# Patient Record
Sex: FEMALE | Race: WHITE | NOT HISPANIC OR LATINO | Employment: STUDENT | URBAN - METROPOLITAN AREA
[De-identification: names, ages, dates, MRNs, and addresses within clinical notes are randomized per-mention and may not be internally consistent; named-entity substitution may affect disease eponyms.]

---
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
## 2018-11-07 ENCOUNTER — OFFICE VISIT (OUTPATIENT)
Dept: FAMILY MEDICINE | Facility: CLINIC | Age: 18
End: 2018-11-07
Payer: COMMERCIAL

## 2018-11-07 VITALS
WEIGHT: 164.31 LBS | BODY MASS INDEX: 26.41 KG/M2 | HEART RATE: 117 BPM | TEMPERATURE: 99 F | DIASTOLIC BLOOD PRESSURE: 72 MMHG | SYSTOLIC BLOOD PRESSURE: 116 MMHG | HEIGHT: 66 IN

## 2018-11-07 DIAGNOSIS — J02.9 SORE THROAT: Primary | ICD-10-CM

## 2018-11-07 LAB
CTP QC/QA: YES
S PYO RRNA THROAT QL PROBE: NEGATIVE

## 2018-11-07 PROCEDURE — 87880 STREP A ASSAY W/OPTIC: CPT | Mod: QW,S$GLB,, | Performed by: NURSE PRACTITIONER

## 2018-11-07 PROCEDURE — 99999 PR PBB SHADOW E&M-EST. PATIENT-LVL III: CPT | Mod: PBBFAC,,, | Performed by: NURSE PRACTITIONER

## 2018-11-07 PROCEDURE — 99213 OFFICE O/P EST LOW 20 MIN: CPT | Mod: S$GLB,,, | Performed by: NURSE PRACTITIONER

## 2018-11-07 PROCEDURE — 3008F BODY MASS INDEX DOCD: CPT | Mod: CPTII,S$GLB,, | Performed by: NURSE PRACTITIONER

## 2018-11-07 RX ORDER — METHYLPREDNISOLONE 4 MG/1
TABLET ORAL
Qty: 1 PACKAGE | Refills: 0 | Status: SHIPPED | OUTPATIENT
Start: 2018-11-07 | End: 2019-02-21

## 2018-11-07 NOTE — PROGRESS NOTES
Subjective:       Patient ID: Jeanna Kraus is a 18 y.o. female.    Chief Complaint: Sore Throat    Sore Throat    This is a new problem. The current episode started in the past 7 days. The problem has been unchanged. There has been no fever. The pain is moderate. Associated symptoms include congestion and trouble swallowing. Pertinent negatives include no abdominal pain, coughing, diarrhea, ear pain, headaches, shortness of breath or vomiting. She has tried acetaminophen (Sudafed) for the symptoms. The treatment provided no relief.       Review of Systems   Constitutional: Negative for fatigue, fever and unexpected weight change.   HENT: Positive for congestion, sore throat and trouble swallowing. Negative for ear pain.    Eyes: Negative for pain and visual disturbance.   Respiratory: Negative for cough and shortness of breath.    Cardiovascular: Negative for chest pain and palpitations.   Gastrointestinal: Negative for abdominal pain, diarrhea and vomiting.   Musculoskeletal: Negative for arthralgias and myalgias.   Skin: Negative for color change and rash.   Neurological: Negative for dizziness and headaches.   Psychiatric/Behavioral: Negative for dysphoric mood and sleep disturbance. The patient is not nervous/anxious.        Vitals:    11/07/18 1551   BP: 116/72   Pulse: (!) 117   Temp: 98.5 °F (36.9 °C)       Objective:     Current Outpatient Medications   Medication Sig Dispense Refill    methylPREDNISolone (MEDROL DOSEPACK) 4 mg tablet use as directed 1 Package 0     No current facility-administered medications for this visit.        Physical Exam   Constitutional: She is oriented to person, place, and time. She appears well-developed and well-nourished. No distress.   HENT:   Head: Normocephalic and atraumatic.   Right Ear: Tympanic membrane normal.   Left Ear: Tympanic membrane normal.   Nose: Mucosal edema and rhinorrhea present.   Mouth/Throat: Oropharyngeal exudate and posterior oropharyngeal edema  present.   Eyes: EOM are normal. Pupils are equal, round, and reactive to light.   Neck: Normal range of motion. Neck supple.   Cardiovascular: Normal rate and regular rhythm.   Pulmonary/Chest: Effort normal and breath sounds normal.   Dry cough   Musculoskeletal: Normal range of motion.   Neurological: She is alert and oriented to person, place, and time.   Skin: Skin is warm and dry. No rash noted.   Psychiatric: She has a normal mood and affect. Judgment normal.   Nursing note and vitals reviewed.      Strep is negative    Assessment:       1. Sore throat        Plan:   Sore throat  -     POCT Rapid Strep A    Other orders  -     methylPREDNISolone (MEDROL DOSEPACK) 4 mg tablet; use as directed  Dispense: 1 Package; Refill: 0        No Follow-up on file.    There are no Patient Instructions on file for this visit.

## 2019-02-21 ENCOUNTER — OFFICE VISIT (OUTPATIENT)
Dept: FAMILY MEDICINE | Facility: CLINIC | Age: 19
End: 2019-02-21
Payer: COMMERCIAL

## 2019-02-21 ENCOUNTER — TELEPHONE (OUTPATIENT)
Dept: FAMILY MEDICINE | Facility: CLINIC | Age: 19
End: 2019-02-21

## 2019-02-21 VITALS
HEIGHT: 66 IN | DIASTOLIC BLOOD PRESSURE: 79 MMHG | BODY MASS INDEX: 26.36 KG/M2 | TEMPERATURE: 99 F | HEART RATE: 118 BPM | WEIGHT: 164 LBS | SYSTOLIC BLOOD PRESSURE: 123 MMHG

## 2019-02-21 DIAGNOSIS — J06.9 UPPER RESPIRATORY TRACT INFECTION, UNSPECIFIED TYPE: Primary | ICD-10-CM

## 2019-02-21 DIAGNOSIS — R68.89 FLU-LIKE SYMPTOMS: ICD-10-CM

## 2019-02-21 LAB
INFLUENZA A, MOLECULAR: NEGATIVE
INFLUENZA B, MOLECULAR: NEGATIVE
SPECIMEN SOURCE: NORMAL

## 2019-02-21 PROCEDURE — 99999 PR PBB SHADOW E&M-EST. PATIENT-LVL III: ICD-10-PCS | Mod: PBBFAC,,, | Performed by: FAMILY MEDICINE

## 2019-02-21 PROCEDURE — 99213 OFFICE O/P EST LOW 20 MIN: CPT | Mod: S$GLB,,, | Performed by: FAMILY MEDICINE

## 2019-02-21 PROCEDURE — 3008F PR BODY MASS INDEX (BMI) DOCUMENTED: ICD-10-PCS | Mod: CPTII,S$GLB,, | Performed by: FAMILY MEDICINE

## 2019-02-21 PROCEDURE — 99213 PR OFFICE/OUTPT VISIT, EST, LEVL III, 20-29 MIN: ICD-10-PCS | Mod: S$GLB,,, | Performed by: FAMILY MEDICINE

## 2019-02-21 PROCEDURE — 3008F BODY MASS INDEX DOCD: CPT | Mod: CPTII,S$GLB,, | Performed by: FAMILY MEDICINE

## 2019-02-21 PROCEDURE — 87502 INFLUENZA DNA AMP PROBE: CPT | Mod: PO

## 2019-02-21 PROCEDURE — 99999 PR PBB SHADOW E&M-EST. PATIENT-LVL III: CPT | Mod: PBBFAC,,, | Performed by: FAMILY MEDICINE

## 2019-02-21 RX ORDER — METHYLPREDNISOLONE 4 MG/1
TABLET ORAL
Qty: 1 PACKAGE | Refills: 0 | Status: SHIPPED | OUTPATIENT
Start: 2019-02-21 | End: 2021-10-07

## 2019-02-21 RX ORDER — OSELTAMIVIR PHOSPHATE 75 MG/1
75 CAPSULE ORAL 2 TIMES DAILY
Qty: 10 CAPSULE | Refills: 0 | Status: CANCELLED | OUTPATIENT
Start: 2019-02-21 | End: 2019-02-26

## 2019-02-21 RX ORDER — NORETHINDRONE ACETATE AND ETHINYL ESTRADIOL 1; 5 MG/1; UG/1
TABLET ORAL DAILY
COMMUNITY
End: 2023-03-29

## 2019-02-21 NOTE — PROGRESS NOTES
Jeanna Kraus presents with moderate upper respiratory congestion,rhinnorhea,moderate cough past 2-3 days. OTC help slightly. Denies nausea,vomiting,diarrhea or significant fever.    History reviewed. No pertinent past medical history.  History reviewed. No pertinent surgical history.  Review of patient's allergies indicates:  No Known Allergies  Current Outpatient Medications on File Prior to Visit   Medication Sig Dispense Refill    norethindrone-ethinyl estradiol (FEMHRT 1/5) 1-5 mg-mcg Tab Take by mouth once daily.      [DISCONTINUED] methylPREDNISolone (MEDROL DOSEPACK) 4 mg tablet use as directed 1 Package 0     No current facility-administered medications on file prior to visit.      Social History     Socioeconomic History    Marital status: Single     Spouse name: Not on file    Number of children: Not on file    Years of education: Not on file    Highest education level: Not on file   Social Needs    Financial resource strain: Not on file    Food insecurity - worry: Not on file    Food insecurity - inability: Not on file    Transportation needs - medical: Not on file    Transportation needs - non-medical: Not on file   Occupational History    Not on file   Tobacco Use    Smoking status: Never Smoker    Smokeless tobacco: Never Used   Substance and Sexual Activity    Alcohol use: No    Drug use: No    Sexual activity: No   Other Topics Concern    Not on file   Social History Narrative    Not on file     History reviewed. No pertinent family history.      ROS:  SKIN: No rashes, itching or changes in color or texture of skin.  EYES: Visual acuity fine. No photophobia, ocular pain or diplopia.EARS: Denies ear pain, discharge or vertigo.NOSE: No loss of smell, no epistaxis some postnasal drip.MOUTH & THROAT: No hoarseness or change in voice. No excessive gum bleeding.CHEST: Denies FELICIANO, cyanosis, wheezing  CARDIOVASCULAR: Denies chest pain, PND, orthopnea or reduced exercise  tolerance.  ABDOMEN:  No weight loss.No abdominal pain, no hematemesis or blood in stool.  URINARY: No flank pain, dysuria or hematuria.  PERIPHERAL VASCULAR: No claudication or cyanosis.  MUSCULOSKELETAL: Negative   NEUROLOGIC: No history of seizures, paralysis, alteration of gait or coordination.    PE: Vital signs as noted  Heent:Normocephalic with no recent cranial trauma,PERRLA,EOMI,conjunctiva clear,fundi reveal no hemmorhage exudate or papilledema.Otic canals clear, tympanic membranes slightly dull bilaterally.Nasal mucosa slightly red and edematous.Posterior pharynx slightly red but without exudate.  Neck:Supple with minimal anterior cervical adenopathy.  Chest:Clear bilateral breath sounds with mild scattered ronchi  Heart:Regular rhthym without murmer  Abdomen:Soft, non tender,no masses, no hepatosplenomegalyExtremeties and Neurologic:Grossly within normal limits  Impression: Upper Respiratory Infection. 465.9  Plan:   Flu test neg  ,medrol dspk

## 2019-02-21 NOTE — TELEPHONE ENCOUNTER
----- Message from Jeaneth Julien sent at 2/21/2019  7:48 AM CST -----  Type:  Same Day Appointment Request    Caller is requesting a same day appointment.  Caller declined first available appointment listed below.    Name of Caller: Pt Dad(Ryan)  When is the first available appointment? 2/22/19  Symptoms: Flu like symptoms  Best Call Back Number: [phone]  Additional Information: Please call [email or web address]//[email or web address]

## 2019-02-21 NOTE — TELEPHONE ENCOUNTER
----- Message from Kasey Velarde sent at 2/21/2019  7:33 AM CST -----  Contact: PT   Type:  Same Day Appointment Request    Caller is requesting a same day appointment.  Caller declined first available appointment listed below.    Name of Caller: Vikas   When is the first available appointment?02/21/19  Symptoms:flu   Best Call Back Number:[phone]  Additional Information: caller states that pt need to be seen after 12:30 due to her being at school

## 2020-01-23 ENCOUNTER — PATIENT OUTREACH (OUTPATIENT)
Dept: ADMINISTRATIVE | Facility: HOSPITAL | Age: 20
End: 2020-01-23

## 2021-05-10 ENCOUNTER — PATIENT MESSAGE (OUTPATIENT)
Dept: RESEARCH | Facility: HOSPITAL | Age: 21
End: 2021-05-10

## 2021-10-07 ENCOUNTER — OFFICE VISIT (OUTPATIENT)
Dept: FAMILY MEDICINE | Facility: CLINIC | Age: 21
End: 2021-10-07
Payer: COMMERCIAL

## 2021-10-07 DIAGNOSIS — R52 BODY ACHES: Primary | ICD-10-CM

## 2021-10-07 DIAGNOSIS — Z11.52 ENCOUNTER FOR SCREENING FOR COVID-19: ICD-10-CM

## 2021-10-07 DIAGNOSIS — R00.0 TACHYCARDIA: ICD-10-CM

## 2021-10-07 DIAGNOSIS — R11.0 NAUSEA: ICD-10-CM

## 2021-10-07 LAB
CTP QC/QA: YES
INFLUENZA A, MOLECULAR: NEGATIVE
INFLUENZA B, MOLECULAR: NEGATIVE
SARS-COV-2 RDRP RESP QL NAA+PROBE: NEGATIVE
SPECIMEN SOURCE: NORMAL

## 2021-10-07 PROCEDURE — 1159F PR MEDICATION LIST DOCUMENTED IN MEDICAL RECORD: ICD-10-PCS | Mod: CPTII,S$GLB,, | Performed by: NURSE PRACTITIONER

## 2021-10-07 PROCEDURE — 93005 ELECTROCARDIOGRAM TRACING: CPT | Mod: S$GLB,,, | Performed by: NURSE PRACTITIONER

## 2021-10-07 PROCEDURE — 93010 ELECTROCARDIOGRAM REPORT: CPT | Mod: S$GLB,,, | Performed by: INTERNAL MEDICINE

## 2021-10-07 PROCEDURE — 3079F DIAST BP 80-89 MM HG: CPT | Mod: CPTII,S$GLB,, | Performed by: NURSE PRACTITIONER

## 2021-10-07 PROCEDURE — 87502 INFLUENZA DNA AMP PROBE: CPT | Mod: PO | Performed by: NURSE PRACTITIONER

## 2021-10-07 PROCEDURE — 99999 PR PBB SHADOW E&M-EST. PATIENT-LVL IV: CPT | Mod: PBBFAC,,, | Performed by: NURSE PRACTITIONER

## 2021-10-07 PROCEDURE — U0002 COVID-19 LAB TEST NON-CDC: HCPCS | Mod: QW,S$GLB,, | Performed by: NURSE PRACTITIONER

## 2021-10-07 PROCEDURE — 3008F BODY MASS INDEX DOCD: CPT | Mod: CPTII,S$GLB,, | Performed by: NURSE PRACTITIONER

## 2021-10-07 PROCEDURE — 93010 EKG 12-LEAD: ICD-10-PCS | Mod: S$GLB,,, | Performed by: INTERNAL MEDICINE

## 2021-10-07 PROCEDURE — U0002: ICD-10-PCS | Mod: QW,S$GLB,, | Performed by: NURSE PRACTITIONER

## 2021-10-07 PROCEDURE — 99213 PR OFFICE/OUTPT VISIT, EST, LEVL III, 20-29 MIN: ICD-10-PCS | Mod: S$GLB,,, | Performed by: NURSE PRACTITIONER

## 2021-10-07 PROCEDURE — 1160F PR REVIEW ALL MEDS BY PRESCRIBER/CLIN PHARMACIST DOCUMENTED: ICD-10-PCS | Mod: CPTII,S$GLB,, | Performed by: NURSE PRACTITIONER

## 2021-10-07 PROCEDURE — 3008F PR BODY MASS INDEX (BMI) DOCUMENTED: ICD-10-PCS | Mod: CPTII,S$GLB,, | Performed by: NURSE PRACTITIONER

## 2021-10-07 PROCEDURE — 99213 OFFICE O/P EST LOW 20 MIN: CPT | Mod: S$GLB,,, | Performed by: NURSE PRACTITIONER

## 2021-10-07 PROCEDURE — 99999 PR PBB SHADOW E&M-EST. PATIENT-LVL IV: ICD-10-PCS | Mod: PBBFAC,,, | Performed by: NURSE PRACTITIONER

## 2021-10-07 PROCEDURE — 1160F RVW MEDS BY RX/DR IN RCRD: CPT | Mod: CPTII,S$GLB,, | Performed by: NURSE PRACTITIONER

## 2021-10-07 PROCEDURE — U0003 INFECTIOUS AGENT DETECTION BY NUCLEIC ACID (DNA OR RNA); SEVERE ACUTE RESPIRATORY SYNDROME CORONAVIRUS 2 (SARS-COV-2) (CORONAVIRUS DISEASE [COVID-19]), AMPLIFIED PROBE TECHNIQUE, MAKING USE OF HIGH THROUGHPUT TECHNOLOGIES AS DESCRIBED BY CMS-2020-01-R: HCPCS | Performed by: NURSE PRACTITIONER

## 2021-10-07 PROCEDURE — 3074F SYST BP LT 130 MM HG: CPT | Mod: CPTII,S$GLB,, | Performed by: NURSE PRACTITIONER

## 2021-10-07 PROCEDURE — U0005 INFEC AGEN DETEC AMPLI PROBE: HCPCS | Performed by: NURSE PRACTITIONER

## 2021-10-07 PROCEDURE — 1159F MED LIST DOCD IN RCRD: CPT | Mod: CPTII,S$GLB,, | Performed by: NURSE PRACTITIONER

## 2021-10-07 PROCEDURE — 3079F PR MOST RECENT DIASTOLIC BLOOD PRESSURE 80-89 MM HG: ICD-10-PCS | Mod: CPTII,S$GLB,, | Performed by: NURSE PRACTITIONER

## 2021-10-07 PROCEDURE — 93005 EKG 12-LEAD: ICD-10-PCS | Mod: S$GLB,,, | Performed by: NURSE PRACTITIONER

## 2021-10-07 PROCEDURE — 3074F PR MOST RECENT SYSTOLIC BLOOD PRESSURE < 130 MM HG: ICD-10-PCS | Mod: CPTII,S$GLB,, | Performed by: NURSE PRACTITIONER

## 2021-10-07 RX ORDER — PREDNISONE 20 MG/1
40 TABLET ORAL DAILY
Qty: 10 TABLET | Refills: 0 | Status: CANCELLED | OUTPATIENT
Start: 2021-10-07 | End: 2021-10-12

## 2021-10-07 RX ORDER — ONDANSETRON 4 MG/1
4 TABLET, FILM COATED ORAL EVERY 8 HOURS PRN
Qty: 30 TABLET | Refills: 0 | Status: SHIPPED | OUTPATIENT
Start: 2021-10-07 | End: 2023-03-29

## 2021-10-07 RX ORDER — FLUTICASONE PROPIONATE 50 MCG
1 SPRAY, SUSPENSION (ML) NASAL DAILY
Qty: 18.2 G | Refills: 0 | Status: CANCELLED | OUTPATIENT
Start: 2021-10-07

## 2021-10-07 RX ORDER — AZITHROMYCIN 250 MG/1
TABLET, FILM COATED ORAL
Qty: 6 TABLET | Refills: 0 | Status: SHIPPED | OUTPATIENT
Start: 2021-10-07 | End: 2021-10-12

## 2021-10-07 RX ORDER — NORETHINDRONE ACETATE AND ETHINYL ESTRADIOL AND FERROUS FUMARATE 1MG-20(24)
1 KIT ORAL DAILY
COMMUNITY
Start: 2021-08-28

## 2021-10-08 LAB
SARS-COV-2 RNA RESP QL NAA+PROBE: NOT DETECTED
SARS-COV-2- CYCLE NUMBER: NORMAL

## 2021-10-10 VITALS
WEIGHT: 156 LBS | HEIGHT: 66 IN | BODY MASS INDEX: 25.07 KG/M2 | SYSTOLIC BLOOD PRESSURE: 119 MMHG | HEART RATE: 148 BPM | DIASTOLIC BLOOD PRESSURE: 83 MMHG | TEMPERATURE: 98 F

## 2021-10-27 ENCOUNTER — TELEPHONE (OUTPATIENT)
Dept: CARDIOLOGY | Facility: CLINIC | Age: 21
End: 2021-10-27
Payer: COMMERCIAL

## 2022-02-03 ENCOUNTER — CLINICAL SUPPORT (OUTPATIENT)
Dept: FAMILY MEDICINE | Facility: CLINIC | Age: 22
End: 2022-02-03
Payer: COMMERCIAL

## 2022-02-03 ENCOUNTER — OFFICE VISIT (OUTPATIENT)
Dept: FAMILY MEDICINE | Facility: CLINIC | Age: 22
End: 2022-02-03
Payer: COMMERCIAL

## 2022-02-03 DIAGNOSIS — R52 BODY ACHES: ICD-10-CM

## 2022-02-03 DIAGNOSIS — R05.9 COUGH: ICD-10-CM

## 2022-02-03 DIAGNOSIS — J06.9 VIRAL UPPER RESPIRATORY TRACT INFECTION: Primary | ICD-10-CM

## 2022-02-03 DIAGNOSIS — R50.9 FEVER, UNSPECIFIED FEVER CAUSE: ICD-10-CM

## 2022-02-03 PROBLEM — Z20.822 ENCOUNTER BY TELEHEALTH FOR SUSPECTED COVID-19: Status: ACTIVE | Noted: 2022-02-03

## 2022-02-03 LAB
CTP QC/QA: YES
CTP QC/QA: YES
FLUAV AG NPH QL: NEGATIVE
FLUBV AG NPH QL: NEGATIVE
SARS-COV-2 RDRP RESP QL NAA+PROBE: NEGATIVE

## 2022-02-03 PROCEDURE — 99213 PR OFFICE/OUTPT VISIT, EST, LEVL III, 20-29 MIN: ICD-10-PCS | Mod: 95,,, | Performed by: NURSE PRACTITIONER

## 2022-02-03 PROCEDURE — 1159F PR MEDICATION LIST DOCUMENTED IN MEDICAL RECORD: ICD-10-PCS | Mod: CPTII,95,, | Performed by: NURSE PRACTITIONER

## 2022-02-03 PROCEDURE — 1160F PR REVIEW ALL MEDS BY PRESCRIBER/CLIN PHARMACIST DOCUMENTED: ICD-10-PCS | Mod: CPTII,95,, | Performed by: NURSE PRACTITIONER

## 2022-02-03 PROCEDURE — 99213 OFFICE O/P EST LOW 20 MIN: CPT | Mod: 95,,, | Performed by: NURSE PRACTITIONER

## 2022-02-03 PROCEDURE — 1160F RVW MEDS BY RX/DR IN RCRD: CPT | Mod: CPTII,95,, | Performed by: NURSE PRACTITIONER

## 2022-02-03 PROCEDURE — 1159F MED LIST DOCD IN RCRD: CPT | Mod: CPTII,95,, | Performed by: NURSE PRACTITIONER

## 2022-02-03 RX ORDER — FLUTICASONE PROPIONATE 50 MCG
1 SPRAY, SUSPENSION (ML) NASAL DAILY
Qty: 18.2 ML | Refills: 0 | Status: SHIPPED | OUTPATIENT
Start: 2022-02-03 | End: 2023-03-29

## 2022-02-03 RX ORDER — PREDNISONE 20 MG/1
40 TABLET ORAL DAILY
Qty: 10 TABLET | Refills: 0 | Status: SHIPPED | OUTPATIENT
Start: 2022-02-03 | End: 2022-02-08

## 2022-02-03 RX ORDER — BENZONATATE 100 MG/1
100 CAPSULE ORAL 3 TIMES DAILY PRN
Qty: 30 CAPSULE | Refills: 0 | Status: SHIPPED | OUTPATIENT
Start: 2022-02-03 | End: 2022-02-13

## 2022-02-03 NOTE — PROGRESS NOTES
Primary Care Telemedicine Note  The patient location is:  Patient's Home - [place]  The chief complaint leading to consultation is:   Chief Complaint   Patient presents with    Cough      Total time: 20 minutes see MDM below. The total time spent on the encounter, which includes face to face time and non-face to face time preparing to see the patient (eg, review of previous medical records, tests), Obtaining and/or reviewing separately obtained history, documenting clinical information in the electronic or other health record, independently interpreting results (not separately reported)/communicating results to the patient/family/caregiver, and/or care coordination (not separately reported).    Visit type: Virtual visit with synchronous audio only and video  Each patient to whom he or she provides medical services by telemedicine is:  (1) informed of the relationship between the physician and patient and the respective role of any other health care provider with respect to management of the patient; and (2) notified that he or she may decline to receive medical services by telemedicine and may withdraw from such care at any time.  =================================================================    Assessment/Plan:  Problem List Items Addressed This Visit    None     Visit Diagnoses     Viral upper respiratory tract infection    -  Primary    Relevant Medications    predniSONE (DELTASONE) 20 MG tablet    fluticasone propionate (FLONASE) 50 mcg/actuation nasal spray    Fever, unspecified fever cause        Relevant Orders    POCT COVID-19 Rapid Screening (Completed)    POCT Influenza A/B (Completed)    Body aches        Relevant Orders    POCT COVID-19 Rapid Screening (Completed)    POCT Influenza A/B (Completed)    Cough        Relevant Medications    benzonatate (TESSALON) 100 MG capsule        Covid- Negative  Flu- Negative     1. Start steroids and flonase as prescribed. Tessalon perles for cough.   2.  Supportive care- rest, increase hydration with water, OTC Tylenol/Ibuprofen for pain/fever.   3. Follow up with PCP if no improvement in symptoms   4. ER precautions for severe or worsening of symptoms     Follow up in about 1 week (around 2/10/2022), or if symptoms worsen or fail to improve, for Follow up with Dr. Vaughn.    Kellie Velarde, NP  _____________________________________________________________________________________________________________________________________________________    CC: fever    HPI: Patient is in clinic today as an established patient here for fever. This is a new problem. The current episode started x4 days ago. The problem is gradually worsening. There has been fever, 100F. She is experiencing no pain. Associated symptoms include congestion, fatigue, body aches, fever, headache, cough. Pertinent negatives include no ear pain, hoarse voice, neck pain, shortness of breath, sneezing, sore throat or swollen glands. Past treatments include tylenol cold & flu. The treatment provided no relief. She has been exposed to her dad who is sick. She did go to a concert last weekend. She has received x1 dose of the covid vaccine.     Past Medical History:  History reviewed. No pertinent past medical history.  History reviewed. No pertinent surgical history.  Review of patient's allergies indicates:  No Known Allergies  Social History     Tobacco Use    Smoking status: Never Smoker    Smokeless tobacco: Never Used   Substance Use Topics    Alcohol use: No    Drug use: No     History reviewed. No pertinent family history.  Current Outpatient Medications on File Prior to Visit   Medication Sig Dispense Refill    norethindrone-e.estradioL-iron (MINASTRIN 24 FE) 1 mg-20 mcg(24) /75 mg (4) Chew Take 1 tablet by mouth once daily.      norethindrone-ethinyl estradiol (FEMHRT 1/5) 1-5 mg-mcg Tab Take by mouth once daily.      ondansetron (ZOFRAN) 4 MG tablet Take 1 tablet (4 mg total) by mouth  every 8 (eight) hours as needed for Nausea. 30 tablet 0     No current facility-administered medications on file prior to visit.       Review of Systems   Constitutional: Positive for fatigue and fever. Negative for appetite change and chills.   HENT: Positive for congestion. Negative for rhinorrhea and sore throat.    Eyes: Negative for visual disturbance.   Respiratory: Positive for cough. Negative for shortness of breath.    Cardiovascular: Negative for chest pain, palpitations and leg swelling.   Gastrointestinal: Negative for abdominal pain, diarrhea and vomiting.   Genitourinary: Negative for difficulty urinating, dysuria and hematuria.   Musculoskeletal: Positive for arthralgias. Negative for myalgias.   Skin: Negative for rash and wound.   Neurological: Positive for headaches. Negative for dizziness.   Psychiatric/Behavioral: Negative for behavioral problems. The patient is not nervous/anxious.        There were no vitals filed for this visit.    Wt Readings from Last 3 Encounters:   10/07/21 70.8 kg (156 lb)   02/21/19 74.4 kg (164 lb)   11/07/18 74.5 kg (164 lb 4.8 oz)       Physical Exam  Vitals reviewed.   Constitutional:       General: She is not in acute distress.     Appearance: Normal appearance. She is normal weight.   HENT:      Head: Normocephalic.      Right Ear: External ear normal.      Left Ear: External ear normal.      Mouth/Throat:      Mouth: Mucous membranes are moist.   Eyes:      Conjunctiva/sclera: Conjunctivae normal.   Pulmonary:      Effort: No tachypnea, accessory muscle usage or respiratory distress (Speaking in complete sentences).   Musculoskeletal:         General: No swelling. Normal range of motion.      Cervical back: Normal range of motion.   Skin:     Coloration: Skin is not pale.      Findings: No rash.   Neurological:      Mental Status: She is alert and oriented to person, place, and time. Mental status is at baseline.   Psychiatric:         Mood and Affect: Mood  normal.         Speech: Speech normal.         Behavior: Behavior normal. Behavior is cooperative.         Judgment: Judgment normal.         Health Maintenance   Topic Date Due    Lipid Panel  Never done    Chlamydia Screening  Never done    TETANUS VACCINE  Never done    Pap Smear  08/15/2022    Hepatitis C Screening  Completed    HPV Vaccines  Completed

## 2022-08-04 ENCOUNTER — PATIENT MESSAGE (OUTPATIENT)
Dept: ADMINISTRATIVE | Facility: HOSPITAL | Age: 22
End: 2022-08-04
Payer: COMMERCIAL

## 2023-01-25 ENCOUNTER — PATIENT MESSAGE (OUTPATIENT)
Dept: ADMINISTRATIVE | Facility: HOSPITAL | Age: 23
End: 2023-01-25
Payer: COMMERCIAL

## 2023-03-06 ENCOUNTER — TELEPHONE (OUTPATIENT)
Dept: FAMILY MEDICINE | Facility: CLINIC | Age: 23
End: 2023-03-06

## 2023-03-06 ENCOUNTER — OFFICE VISIT (OUTPATIENT)
Dept: FAMILY MEDICINE | Facility: CLINIC | Age: 23
End: 2023-03-06
Payer: COMMERCIAL

## 2023-03-06 VITALS
OXYGEN SATURATION: 99 % | DIASTOLIC BLOOD PRESSURE: 74 MMHG | HEART RATE: 100 BPM | BODY MASS INDEX: 25.39 KG/M2 | WEIGHT: 158 LBS | SYSTOLIC BLOOD PRESSURE: 126 MMHG | HEIGHT: 66 IN

## 2023-03-06 DIAGNOSIS — R21 RASH AND NONSPECIFIC SKIN ERUPTION: Primary | ICD-10-CM

## 2023-03-06 PROCEDURE — 1159F MED LIST DOCD IN RCRD: CPT | Mod: CPTII,S$GLB,, | Performed by: NURSE PRACTITIONER

## 2023-03-06 PROCEDURE — 3008F PR BODY MASS INDEX (BMI) DOCUMENTED: ICD-10-PCS | Mod: CPTII,S$GLB,, | Performed by: NURSE PRACTITIONER

## 2023-03-06 PROCEDURE — 99213 PR OFFICE/OUTPT VISIT, EST, LEVL III, 20-29 MIN: ICD-10-PCS | Mod: S$GLB,,, | Performed by: NURSE PRACTITIONER

## 2023-03-06 PROCEDURE — 99999 PR PBB SHADOW E&M-EST. PATIENT-LVL IV: ICD-10-PCS | Mod: PBBFAC,,, | Performed by: NURSE PRACTITIONER

## 2023-03-06 PROCEDURE — 1160F PR REVIEW ALL MEDS BY PRESCRIBER/CLIN PHARMACIST DOCUMENTED: ICD-10-PCS | Mod: CPTII,S$GLB,, | Performed by: NURSE PRACTITIONER

## 2023-03-06 PROCEDURE — 3008F BODY MASS INDEX DOCD: CPT | Mod: CPTII,S$GLB,, | Performed by: NURSE PRACTITIONER

## 2023-03-06 PROCEDURE — 99213 OFFICE O/P EST LOW 20 MIN: CPT | Mod: S$GLB,,, | Performed by: NURSE PRACTITIONER

## 2023-03-06 PROCEDURE — 1160F RVW MEDS BY RX/DR IN RCRD: CPT | Mod: CPTII,S$GLB,, | Performed by: NURSE PRACTITIONER

## 2023-03-06 PROCEDURE — 3074F PR MOST RECENT SYSTOLIC BLOOD PRESSURE < 130 MM HG: ICD-10-PCS | Mod: CPTII,S$GLB,, | Performed by: NURSE PRACTITIONER

## 2023-03-06 PROCEDURE — 3078F DIAST BP <80 MM HG: CPT | Mod: CPTII,S$GLB,, | Performed by: NURSE PRACTITIONER

## 2023-03-06 PROCEDURE — 3074F SYST BP LT 130 MM HG: CPT | Mod: CPTII,S$GLB,, | Performed by: NURSE PRACTITIONER

## 2023-03-06 PROCEDURE — 1159F PR MEDICATION LIST DOCUMENTED IN MEDICAL RECORD: ICD-10-PCS | Mod: CPTII,S$GLB,, | Performed by: NURSE PRACTITIONER

## 2023-03-06 PROCEDURE — 99999 PR PBB SHADOW E&M-EST. PATIENT-LVL IV: CPT | Mod: PBBFAC,,, | Performed by: NURSE PRACTITIONER

## 2023-03-06 PROCEDURE — 3078F PR MOST RECENT DIASTOLIC BLOOD PRESSURE < 80 MM HG: ICD-10-PCS | Mod: CPTII,S$GLB,, | Performed by: NURSE PRACTITIONER

## 2023-03-06 RX ORDER — PREDNISONE 20 MG/1
40 TABLET ORAL DAILY
Qty: 10 TABLET | Refills: 0 | Status: SHIPPED | OUTPATIENT
Start: 2023-03-06 | End: 2023-03-11

## 2023-03-06 RX ORDER — TRIAMCINOLONE ACETONIDE 1 MG/G
OINTMENT TOPICAL 2 TIMES DAILY
Qty: 80 G | Refills: 0 | Status: SHIPPED | OUTPATIENT
Start: 2023-03-06 | End: 2023-03-09

## 2023-03-06 RX ORDER — LEVOCETIRIZINE DIHYDROCHLORIDE 5 MG/1
5 TABLET, FILM COATED ORAL NIGHTLY
Qty: 30 TABLET | Refills: 11 | Status: SHIPPED | OUTPATIENT
Start: 2023-03-06 | End: 2023-03-29

## 2023-03-06 NOTE — PROGRESS NOTES
Assessment/Plan:  Problem List Items Addressed This Visit          Derm    Rash and nonspecific skin eruption - Primary    Overview     New problem. Onset x2 weeks ago. Located to torso primarily to sides bilaterally. She notes recently using a new detergent. Reports usually uses [employer] brand but did try to switch to Purex but notes it was a hypoallergenic detergent. She has since switched back to previous but has had no improvement. She notes rash has progressively worsened. Mildly pruritic. She has tried OTC hydrocortisone with no improvement. 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 Gregorio in the past. She notes getting a new dog in January. Also notes having Covid in January. Denies: abdominal pain, arthralgia, cough, fever, headache, irritability, myalgia, nausea, sore throat, and vomiting. Patient has not had previous evaluation of rash. Patient has not had previous treatment. Patient has not had contacts with similar rash. Patient has not identified precipitant. Patient has not had new exposures (soaps, lotions, foods, medications, plants, insects.)    Start PO steroids. Recommend topical triamcinolone.   Also recommend daily oral antihistamine- start Xyzal as prescribed  Advised to closely follow up with dermatology          Relevant Medications    predniSONE (DELTASONE) 20 MG tablet    triamcinolone acetonide 0.1% (KENALOG) 0.1 % ointment    levocetirizine (XYZAL) 5 MG tablet    Other Relevant Orders    Ambulatory referral/consult to Dermatology     Follow up if symptoms worsen or fail to improve.  ER precautions for severe or worsening symptoms.     Kellie Velarde NP  _____________________________________________________________________________________________________________________________________________________    CC: rash    HPI: Patient is a 22-year-old female who presents in clinic today as an established patient here for rash. The is a new problem. Onset x2  weeks ago. Located to torso primarily to sides bilaterally. She notes recently using a new detergent. Reports usually uses [employer] and Hammer brand but did try to switch to Purex but notes it was a hypoallergenic detergent. She has since switched back to previous but has had no improvement. She notes rash has progressively worsened. Mildly pruritic. She has tried OTC hydrocortisone with no improvement. 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 Gregorio in the past. She notes getting a new dog in January. Also notes having Covid in January. Denies: abdominal pain, arthralgia, cough, fever, headache, irritability, myalgia, nausea, sore throat, and vomiting. Patient has not had previous evaluation of rash. Patient has not had previous treatment. Patient has not had contacts with similar rash. Patient has not identified precipitant. Patient has not had new exposures (soaps, lotions, foods, medications, plants, insects.)    Past Medical History:  History reviewed. No pertinent past medical history.  History reviewed. No pertinent surgical history.  Review of patient's allergies indicates:  No Known Allergies  Social History     Tobacco Use    Smoking status: Never    Smokeless tobacco: Never   Substance Use Topics    Alcohol use: No    Drug use: No     History reviewed. No pertinent family history.  Current Outpatient Medications on File Prior to Visit   Medication Sig Dispense Refill    norethindrone-e.estradioL-iron (MINASTRIN 24 FE) 1 mg-20 mcg(24) /75 mg (4) Chew Take 1 tablet by mouth once daily.      ondansetron (ZOFRAN) 4 MG tablet Take 1 tablet (4 mg total) by mouth every 8 (eight) hours as needed for Nausea. 30 tablet 0    DM/pseudoephed/acetaminophen (PSEUDOEPHED-DM-ACETAMINOPHEN ORAL) Vicks DayQuil Take No date recorded No form recorded No frequency recorded No route recorded No set duration recorded No set duration amount recorded suspended No dosage strength recorded No dosage  "strength units of measure recorded      fluticasone propionate (FLONASE) 50 mcg/actuation nasal spray 1 spray (50 mcg total) by Each Nostril route once daily. (Patient not taking: Reported on 3/6/2023) 18.2 mL 0    norethindrone-ethinyl estradiol (FEMHRT 1/5) 1-5 mg-mcg Tab Take by mouth once daily.       No current facility-administered medications on file prior to visit.     Review of Systems   Constitutional:  Negative for appetite change, chills, fatigue and fever.   HENT:  Negative for congestion, rhinorrhea and sore throat.    Eyes:  Negative for visual disturbance.   Respiratory:  Negative for cough and shortness of breath.    Cardiovascular:  Negative for chest pain, palpitations and leg swelling.   Gastrointestinal:  Negative for abdominal pain, diarrhea and vomiting.   Genitourinary:  Negative for difficulty urinating, dysuria and hematuria.   Musculoskeletal:  Negative for arthralgias and myalgias.   Skin:  Positive for rash. Negative for wound.   Neurological:  Negative for dizziness and headaches.   Psychiatric/Behavioral:  Negative for behavioral problems. The patient is not nervous/anxious.      Vitals:    03/06/23 1506   BP: 126/74   BP Location: Right arm   Pulse: 100   SpO2: 99%   Weight: 71.7 kg (158 lb)   Height: 5' 6" (1.676 m)     Wt Readings from Last 3 Encounters:   03/06/23 71.7 kg (158 lb)   10/07/21 70.8 kg (156 lb)   02/21/19 74.4 kg (164 lb)     Physical Exam  Vitals reviewed.   Constitutional:       General: She is not in acute distress.     Appearance: Normal appearance. She is not ill-appearing.   HENT:      Head: Normocephalic and atraumatic.      Right Ear: External ear normal.      Left Ear: External ear normal.      Nose: Nose normal.   Eyes:      Extraocular Movements: Extraocular movements intact.      Conjunctiva/sclera: Conjunctivae normal.   Cardiovascular:      Rate and Rhythm: Normal rate.      Heart sounds: Normal heart sounds.   Pulmonary:      Effort: Pulmonary effort is " normal. No respiratory distress.      Breath sounds: Normal breath sounds.   Abdominal:      General: Abdomen is flat. There is no distension.   Musculoskeletal:         General: Normal range of motion.      Cervical back: Normal range of motion.   Skin:     General: Skin is warm and dry.      Capillary Refill: Capillary refill takes less than 2 seconds.      Coloration: Skin is not pale.      Findings: Rash present.      Comments: See photo below   Neurological:      General: No focal deficit present.      Mental Status: She is alert and oriented to person, place, and time. Mental status is at baseline.      Motor: No weakness.      Gait: Gait normal.   Psychiatric:         Mood and Affect: Mood normal.         Speech: Speech normal.         Behavior: Behavior normal. Behavior is cooperative.         Thought Content: Thought content normal.         Judgment: Judgment normal.             Health Maintenance   Topic Date Due    Lipid Panel  Never done    Chlamydia Screening  Never done    Pap Smear  12/02/2025    TETANUS VACCINE  01/29/2030    Hepatitis C Screening  Completed    HPV Vaccines  Completed

## 2023-03-06 NOTE — TELEPHONE ENCOUNTER
I called and spoke with [facility] about this. Prescription verified. Brissa reports no additional questions needed and phone call ended.

## 2023-03-06 NOTE — TELEPHONE ENCOUNTER
----- Message from Dianne Owen sent at 3/6/2023  4:04 PM CST -----  Contact: Randi rey/ Brissa [phone]  Pharmacy is calling to clarify an RX.  RX name:  triamcinolone acetonide 0.1% (KENALOG) 0.1 % ointment  What do they need to clarify:  She need to know, how many days supply?

## 2023-03-09 DIAGNOSIS — R21 RASH AND NONSPECIFIC SKIN ERUPTION: ICD-10-CM

## 2023-03-09 RX ORDER — TRIAMCINOLONE ACETONIDE 1 MG/G
OINTMENT TOPICAL
Qty: 80 G | Refills: 0 | Status: SHIPPED | OUTPATIENT
Start: 2023-03-09 | End: 2023-03-29

## 2023-03-29 ENCOUNTER — OFFICE VISIT (OUTPATIENT)
Dept: FAMILY MEDICINE | Facility: CLINIC | Age: 23
End: 2023-03-29
Payer: COMMERCIAL

## 2023-03-29 VITALS
SYSTOLIC BLOOD PRESSURE: 107 MMHG | DIASTOLIC BLOOD PRESSURE: 74 MMHG | TEMPERATURE: 97 F | HEIGHT: 66 IN | HEART RATE: 110 BPM | WEIGHT: 159.63 LBS | BODY MASS INDEX: 25.66 KG/M2 | OXYGEN SATURATION: 99 %

## 2023-03-29 DIAGNOSIS — Z11.3 SCREENING FOR STD (SEXUALLY TRANSMITTED DISEASE): Primary | ICD-10-CM

## 2023-03-29 DIAGNOSIS — Z11.4 SCREENING FOR HIV (HUMAN IMMUNODEFICIENCY VIRUS): ICD-10-CM

## 2023-03-29 DIAGNOSIS — E66.3 OVERWEIGHT (BMI 25.0-29.9): ICD-10-CM

## 2023-03-29 DIAGNOSIS — Z00.00 ANNUAL PHYSICAL EXAM: ICD-10-CM

## 2023-03-29 DIAGNOSIS — R41.840 LACK OF CONCENTRATION: ICD-10-CM

## 2023-03-29 DIAGNOSIS — N91.2 NO PERIODS: ICD-10-CM

## 2023-03-29 LAB — B-HCG UR QL: NEGATIVE

## 2023-03-29 PROCEDURE — 1159F PR MEDICATION LIST DOCUMENTED IN MEDICAL RECORD: ICD-10-PCS | Mod: CPTII,S$GLB,, | Performed by: STUDENT IN AN ORGANIZED HEALTH CARE EDUCATION/TRAINING PROGRAM

## 2023-03-29 PROCEDURE — 3008F BODY MASS INDEX DOCD: CPT | Mod: CPTII,S$GLB,, | Performed by: STUDENT IN AN ORGANIZED HEALTH CARE EDUCATION/TRAINING PROGRAM

## 2023-03-29 PROCEDURE — 99999 PR PBB SHADOW E&M-EST. PATIENT-LVL III: ICD-10-PCS | Mod: PBBFAC,,, | Performed by: STUDENT IN AN ORGANIZED HEALTH CARE EDUCATION/TRAINING PROGRAM

## 2023-03-29 PROCEDURE — 3074F PR MOST RECENT SYSTOLIC BLOOD PRESSURE < 130 MM HG: ICD-10-PCS | Mod: CPTII,S$GLB,, | Performed by: STUDENT IN AN ORGANIZED HEALTH CARE EDUCATION/TRAINING PROGRAM

## 2023-03-29 PROCEDURE — 99204 OFFICE O/P NEW MOD 45 MIN: CPT | Mod: S$GLB,,, | Performed by: STUDENT IN AN ORGANIZED HEALTH CARE EDUCATION/TRAINING PROGRAM

## 2023-03-29 PROCEDURE — 3078F PR MOST RECENT DIASTOLIC BLOOD PRESSURE < 80 MM HG: ICD-10-PCS | Mod: CPTII,S$GLB,, | Performed by: STUDENT IN AN ORGANIZED HEALTH CARE EDUCATION/TRAINING PROGRAM

## 2023-03-29 PROCEDURE — 99204 PR OFFICE/OUTPT VISIT, NEW, LEVL IV, 45-59 MIN: ICD-10-PCS | Mod: S$GLB,,, | Performed by: STUDENT IN AN ORGANIZED HEALTH CARE EDUCATION/TRAINING PROGRAM

## 2023-03-29 PROCEDURE — 3074F SYST BP LT 130 MM HG: CPT | Mod: CPTII,S$GLB,, | Performed by: STUDENT IN AN ORGANIZED HEALTH CARE EDUCATION/TRAINING PROGRAM

## 2023-03-29 PROCEDURE — 3008F PR BODY MASS INDEX (BMI) DOCUMENTED: ICD-10-PCS | Mod: CPTII,S$GLB,, | Performed by: STUDENT IN AN ORGANIZED HEALTH CARE EDUCATION/TRAINING PROGRAM

## 2023-03-29 PROCEDURE — 1160F PR REVIEW ALL MEDS BY PRESCRIBER/CLIN PHARMACIST DOCUMENTED: ICD-10-PCS | Mod: CPTII,S$GLB,, | Performed by: STUDENT IN AN ORGANIZED HEALTH CARE EDUCATION/TRAINING PROGRAM

## 2023-03-29 PROCEDURE — 3078F DIAST BP <80 MM HG: CPT | Mod: CPTII,S$GLB,, | Performed by: STUDENT IN AN ORGANIZED HEALTH CARE EDUCATION/TRAINING PROGRAM

## 2023-03-29 PROCEDURE — 99999 PR PBB SHADOW E&M-EST. PATIENT-LVL III: CPT | Mod: PBBFAC,,, | Performed by: STUDENT IN AN ORGANIZED HEALTH CARE EDUCATION/TRAINING PROGRAM

## 2023-03-29 PROCEDURE — 1159F MED LIST DOCD IN RCRD: CPT | Mod: CPTII,S$GLB,, | Performed by: STUDENT IN AN ORGANIZED HEALTH CARE EDUCATION/TRAINING PROGRAM

## 2023-03-29 PROCEDURE — 1160F RVW MEDS BY RX/DR IN RCRD: CPT | Mod: CPTII,S$GLB,, | Performed by: STUDENT IN AN ORGANIZED HEALTH CARE EDUCATION/TRAINING PROGRAM

## 2023-03-29 PROCEDURE — 81025 URINE PREGNANCY TEST: CPT | Mod: PO | Performed by: STUDENT IN AN ORGANIZED HEALTH CARE EDUCATION/TRAINING PROGRAM

## 2023-03-29 PROCEDURE — 87491 CHLMYD TRACH DNA AMP PROBE: CPT | Performed by: STUDENT IN AN ORGANIZED HEALTH CARE EDUCATION/TRAINING PROGRAM

## 2023-03-29 RX ORDER — BUPROPION HYDROCHLORIDE 150 MG/1
150 TABLET, EXTENDED RELEASE ORAL 2 TIMES DAILY
Qty: 60 TABLET | Refills: 11 | Status: SHIPPED | OUTPATIENT
Start: 2023-03-29 | End: 2024-03-28

## 2023-03-29 NOTE — PROGRESS NOTES
Problem List Items Addressed This Visit          Neuro    Lack of concentration    Overview     Chronic hx; she would like formal testing for ADD/ADHD  - start wellbutrin   - referral to pyschology   8w VV         Relevant Medications    buPROPion (WELLBUTRIN SR) 150 MG TBSR 12 hr tablet    Other Relevant Orders    Ambulatory referral/consult to Psychology       Renal/    No periods    Overview     Reports she has been amenorrheic for 2 months  Reports she has missed feb and march's period. Reports she has not been skipping the placebo pills in OCP pack. Reports seldom missing ocp.     Lmp: 1/17/23    UPT ordered           Relevant Orders    Pregnancy, urine rapid       Endocrine    Overweight (BMI 25.0-29.9)    Overview     Wt Readings from Last 3 Encounters:   03/29/23 1542 72.4 kg (159 lb 9.6 oz)   03/06/23 1506 71.7 kg (158 lb)   10/07/21 1438 70.8 kg (156 lb)     General weight loss/lifestyle modification strategies discussed: limit sugary drinks, exercise 3-5x per week  Informal exercise measures discussed, e.g. taking stairs instead of elevator.                 Relevant Orders    CBC Auto Differential    Comprehensive Metabolic Panel    Hemoglobin A1C    Lipid Panel    TSH     Other Visit Diagnoses       Screening for STD (sexually transmitted disease)    -  Primary    Relevant Orders    C.trach/N.gonor AMP RNA    Annual physical exam        Relevant Orders    CBC Auto Differential    Comprehensive Metabolic Panel    Hemoglobin A1C    Lipid Panel    TSH    Screening for HIV (human immunodeficiency virus)        Relevant Orders    HIV 1/2 Ag/Ab (4th Gen)              Patient ID: Jeanna Kraus is a 22 y.o. female.    Chief Complaint:  establish care    Patient is here to establish care. Has a hx of  has no past medical history on file.     Mom and brother with hx of ADHD; they are currently on stimulants. 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 Reports no hx of formal  testing.    Reports she has missed feb and march's period. Reports she has not been skipping the placebo pills in OCP pack. Reports seldom missing ocp.     Lmp: 1/17/23     History:  OBGYN: [facility]    MGM: n/a  PAP: UTD, no hx abnml screening  Colonoscopy: No personal history of colon cancer, hematochezia, melena, crohn's, ulcerative colitis; No family history of colon cancer.      Health Maintenance Topics with due status: Not Due       Topic Last Completion Date    TETANUS VACCINE 01/29/2020    Pap Smear 12/02/2022        ==============================================  History reviewed.   Health Maintenance Due   Topic Date Due    Lipid Panel  Never done    HIV Screening  Never done    Chlamydia Screening  Never done    COVID-19 Vaccine (2 - Pfizer series) 01/17/2022    Influenza Vaccine (1) Never done       Past Medical History:  History reviewed. No pertinent past medical history.  History reviewed. No pertinent surgical history.  Review of patient's allergies indicates:  No Known Allergies  Current Outpatient Medications on File Prior to Visit   Medication Sig Dispense Refill    norethindrone-e.estradioL-iron (MINASTRIN 24 FE) 1 mg-20 mcg(24) /75 mg (4) Chew Take 1 tablet by mouth once daily.      DM/pseudoephed/acetaminophen (PSEUDOEPHED-DM-ACETAMINOPHEN ORAL) Vicks DayQuil Take No date recorded No form recorded No frequency recorded No route recorded No set duration recorded No set duration amount recorded suspended No dosage strength recorded No dosage strength units of measure recorded      [DISCONTINUED] fluticasone propionate (FLONASE) 50 mcg/actuation nasal spray 1 spray (50 mcg total) by Each Nostril route once daily. (Patient not taking: Reported on 3/6/2023) 18.2 mL 0    [DISCONTINUED] levocetirizine (XYZAL) 5 MG tablet Take 1 tablet (5 mg total) by mouth every evening. (Patient not taking: Reported on 3/29/2023) 30 tablet 11    [DISCONTINUED] norethindrone-ethinyl estradiol (FEMHRT 1/5) 1-5  mg-mcg Tab Take by mouth once daily.      [DISCONTINUED] ondansetron (ZOFRAN) 4 MG tablet Take 1 tablet (4 mg total) by mouth every 8 (eight) hours as needed for Nausea. (Patient not taking: Reported on 3/29/2023) 30 tablet 0    [DISCONTINUED] triamcinolone acetonide 0.1% (KENALOG) 0.1 % ointment APPLT TOPICALLY TWICE DAILY (Patient not taking: Reported on 3/29/2023) 80 g 0     No current facility-administered medications on file prior to visit.     Social History     Socioeconomic History    Marital status: Single   Tobacco Use    Smoking status: Never    Smokeless tobacco: Never   Substance and Sexual Activity    Alcohol use: No    Drug use: No    Sexual activity: Never     History reviewed. No pertinent family history.       Review of Systems   12 point review of systems per hpi, otherwise negative         Objective:    Nursing note and vitals reviewed.  Vitals:    03/29/23 1542   BP: 107/74   Pulse: 110   Temp: 97.3 °F (36.3 °C)     Body mass index is 25.76 kg/m².     Physical Exam   Constitutional: SHE is oriented to person, place, and time. She appears well-developed and well-nourished. No distress.   HENT: WNL  Head: Normocephalic and atraumatic.   Eyes: Pupils are equal, round, and reactive to light. EOM are normal.   Neck: Normal range of motion. Neck supple.   Cardiovascular: Normal rate, regular rhythm, normal heart sounds and intact distal pulses.   No murmur heard.  Pulmonary/Chest: Effort normal and breath sounds normal. No respiratory distress. She has no wheezes.   GI: soft, non distended, no ttp, no rebound/guarding  Musculoskeletal: Normal range of motion. She exhibits no edema.   Neurological: She is alert and oriented to person, place, and time. No cranial nerve deficit.   Skin: Skin is warm and dry. Capillary refill takes less than 2 seconds.   Psychiatric: She has a normal mood and affect. Her behavior is normal.           Sabrina Baker MD    We Offer Telehealth & Same Day Appointments!    Book your Telehealth appointment with me through my nurse or   Clinic appointments on MyChart!  Erfkes-[phone]     To Schedule appointments online, go to [facility]: [email or web address]

## 2023-03-30 NOTE — PROGRESS NOTES
I have sent a msg to patient with the following interpretation (see below):    gonorrhea and chlamydia screening; both normal  Negative pregnancy test    Please do not hesitate to call or message with any questions or concerns    Sabrina Baker MD

## 2023-04-02 LAB
C TRACH RRNA SPEC QL NAA+PROBE: NEGATIVE
N GONORRHOEA RRNA SPEC QL NAA+PROBE: NEGATIVE
N.GONORROHEAE, AMP RNA SOURCE: NORMAL
SPECIMEN SOURCE: NORMAL

## 2023-04-12 ENCOUNTER — LAB VISIT (OUTPATIENT)
Dept: LAB | Facility: HOSPITAL | Age: 23
End: 2023-04-12
Attending: STUDENT IN AN ORGANIZED HEALTH CARE EDUCATION/TRAINING PROGRAM
Payer: COMMERCIAL

## 2023-04-12 DIAGNOSIS — Z00.00 ANNUAL PHYSICAL EXAM: ICD-10-CM

## 2023-04-12 DIAGNOSIS — E66.3 OVERWEIGHT (BMI 25.0-29.9): ICD-10-CM

## 2023-04-12 DIAGNOSIS — Z11.4 SCREENING FOR HIV (HUMAN IMMUNODEFICIENCY VIRUS): ICD-10-CM

## 2023-04-12 LAB
BASOPHILS # BLD AUTO: 0.05 K/UL (ref 0–0.2)
BASOPHILS NFR BLD: 0.7 % (ref 0–1.9)
CHOLEST SERPL-MCNC: 256 MG/DL (ref 120–199)
CHOLEST/HDLC SERPL: 4.5 {RATIO} (ref 2–5)
DIFFERENTIAL METHOD: ABNORMAL
EOSINOPHIL # BLD AUTO: 0.1 K/UL (ref 0–0.5)
EOSINOPHIL NFR BLD: 1.9 % (ref 0–8)
ERYTHROCYTE [DISTWIDTH] IN BLOOD BY AUTOMATED COUNT: 12.6 % (ref 11.5–14.5)
ESTIMATED AVG GLUCOSE: 97 MG/DL (ref 68–131)
HBA1C MFR BLD: 5 % (ref 4–5.6)
HCT VFR BLD AUTO: 42.5 % (ref 37–48.5)
HDLC SERPL-MCNC: 57 MG/DL (ref 40–75)
HDLC SERPL: 22.3 % (ref 20–50)
HGB BLD-MCNC: 14 G/DL (ref 12–16)
HIV 1+2 AB+HIV1 P24 AG SERPL QL IA: NORMAL
IMM GRANULOCYTES # BLD AUTO: 0.01 K/UL (ref 0–0.04)
IMM GRANULOCYTES NFR BLD AUTO: 0.1 % (ref 0–0.5)
LDLC SERPL CALC-MCNC: 175.4 MG/DL (ref 63–159)
LYMPHOCYTES # BLD AUTO: 3.7 K/UL (ref 1–4.8)
LYMPHOCYTES NFR BLD: 49.9 % (ref 18–48)
MCH RBC QN AUTO: 29.6 PG (ref 27–31)
MCHC RBC AUTO-ENTMCNC: 32.9 G/DL (ref 32–36)
MCV RBC AUTO: 90 FL (ref 82–98)
MONOCYTES # BLD AUTO: 0.5 K/UL (ref 0.3–1)
MONOCYTES NFR BLD: 6 % (ref 4–15)
NEUTROPHILS # BLD AUTO: 3.1 K/UL (ref 1.8–7.7)
NEUTROPHILS NFR BLD: 41.4 % (ref 38–73)
NONHDLC SERPL-MCNC: 199 MG/DL
NRBC BLD-RTO: 0 /100 WBC
PLATELET # BLD AUTO: 266 K/UL (ref 150–450)
PMV BLD AUTO: 11 FL (ref 9.2–12.9)
RBC # BLD AUTO: 4.73 M/UL (ref 4–5.4)
TRIGL SERPL-MCNC: 118 MG/DL (ref 30–150)
TSH SERPL DL<=0.005 MIU/L-ACNC: 1.43 UIU/ML (ref 0.4–4)
WBC # BLD AUTO: 7.5 K/UL (ref 3.9–12.7)

## 2023-04-12 PROCEDURE — 80061 LIPID PANEL: CPT | Performed by: STUDENT IN AN ORGANIZED HEALTH CARE EDUCATION/TRAINING PROGRAM

## 2023-04-12 PROCEDURE — 36415 COLL VENOUS BLD VENIPUNCTURE: CPT | Mod: PO | Performed by: STUDENT IN AN ORGANIZED HEALTH CARE EDUCATION/TRAINING PROGRAM

## 2023-04-12 PROCEDURE — 83036 HEMOGLOBIN GLYCOSYLATED A1C: CPT | Performed by: STUDENT IN AN ORGANIZED HEALTH CARE EDUCATION/TRAINING PROGRAM

## 2023-04-12 PROCEDURE — 80053 COMPREHEN METABOLIC PANEL: CPT | Performed by: STUDENT IN AN ORGANIZED HEALTH CARE EDUCATION/TRAINING PROGRAM

## 2023-04-12 PROCEDURE — 85025 COMPLETE CBC W/AUTO DIFF WBC: CPT | Performed by: STUDENT IN AN ORGANIZED HEALTH CARE EDUCATION/TRAINING PROGRAM

## 2023-04-12 PROCEDURE — 84443 ASSAY THYROID STIM HORMONE: CPT | Performed by: STUDENT IN AN ORGANIZED HEALTH CARE EDUCATION/TRAINING PROGRAM

## 2023-04-12 PROCEDURE — 87389 HIV-1 AG W/HIV-1&-2 AB AG IA: CPT | Performed by: STUDENT IN AN ORGANIZED HEALTH CARE EDUCATION/TRAINING PROGRAM

## 2023-04-13 ENCOUNTER — TELEPHONE (OUTPATIENT)
Dept: FAMILY MEDICINE | Facility: CLINIC | Age: 23
End: 2023-04-13
Payer: COMMERCIAL

## 2023-04-13 DIAGNOSIS — E78.2 HYPERLIPEMIA, MIXED: Primary | ICD-10-CM

## 2023-04-13 DIAGNOSIS — R17 ELEVATED BILIRUBIN: ICD-10-CM

## 2023-04-13 LAB
ALBUMIN SERPL BCP-MCNC: 4.1 G/DL (ref 3.5–5.2)
ALP SERPL-CCNC: 34 U/L (ref 55–135)
ALT SERPL W/O P-5'-P-CCNC: 9 U/L (ref 10–44)
ANION GAP SERPL CALC-SCNC: 14 MMOL/L (ref 8–16)
AST SERPL-CCNC: 15 U/L (ref 10–40)
BILIRUB SERPL-MCNC: 0.6 MG/DL (ref 0.1–1)
BUN SERPL-MCNC: 15 MG/DL (ref 6–20)
CALCIUM SERPL-MCNC: 9.9 MG/DL (ref 8.7–10.5)
CHLORIDE SERPL-SCNC: 104 MMOL/L (ref 95–110)
CO2 SERPL-SCNC: 22 MMOL/L (ref 23–29)
CREAT SERPL-MCNC: 0.9 MG/DL (ref 0.5–1.4)
EST. GFR  (NO RACE VARIABLE): >60 ML/MIN/1.73 M^2
GLUCOSE SERPL-MCNC: 83 MG/DL (ref 70–110)
POTASSIUM SERPL-SCNC: 4.7 MMOL/L (ref 3.5–5.1)
PROT SERPL-MCNC: 7.4 G/DL (ref 6–8.4)
SODIUM SERPL-SCNC: 140 MMOL/L (ref 136–145)

## 2023-04-13 NOTE — PROGRESS NOTES
Please schedule the following orders:  6m lipid      I have sent a msg to patient with the following interpretation (see below):      Dear MsGregoria Kraus       I have reviewed your recent blood work:     - Your HIV screening normal (patients are recommended to be screened at least once in their lifetime)     - Your complete blood count is unremarkable      - Your metabolic panel which shows your electrolytes, glucose, kidney function, and liver function nml    ** lab initially resulted bilirubin of 3.4; however, corrected result to 0.6 (this is a normal value)    - A1c is normal (labs to screen for prediabetes and diabetes)     - Thyroid function is nml    Your cholesterol is elevated. You can take OTC (over the counter) omega 3 fatty acid/ fish oil tabs. Continue to limit fatty foods; participate in regular exercise. We will recheck in 6-12 months.     Please do not hesitate to call or message with any additional questions or concerns.  Sabrina Baker MD

## 2023-04-13 NOTE — TELEPHONE ENCOUNTER
----- Message from Tricia Rodríguez sent at 4/13/2023  3:01 PM CDT -----  Contact: Jeanna Bermudez was returning the phone call for her test results. Please call her back at [phone].     Thanks  TS

## 2023-04-14 ENCOUNTER — TELEPHONE (OUTPATIENT)
Dept: FAMILY MEDICINE | Facility: CLINIC | Age: 23
End: 2023-04-14
Payer: COMMERCIAL

## 2023-04-14 NOTE — TELEPHONE ENCOUNTER
I called and spoke with the patient regarding her labs. Pt reports she did review her labs in the patient portal and will call back to schedule the additional testing. Please see result note for additional documentation.